# Patient Record
Sex: MALE | Race: WHITE
[De-identification: names, ages, dates, MRNs, and addresses within clinical notes are randomized per-mention and may not be internally consistent; named-entity substitution may affect disease eponyms.]

---

## 2017-03-31 ENCOUNTER — HOSPITAL ENCOUNTER (OUTPATIENT)
Dept: HOSPITAL 47 - RADCTMAIN | Age: 22
Discharge: HOME | End: 2017-03-31
Payer: COMMERCIAL

## 2017-03-31 DIAGNOSIS — R51: ICD-10-CM

## 2017-03-31 DIAGNOSIS — G51.9: ICD-10-CM

## 2017-03-31 DIAGNOSIS — R55: Primary | ICD-10-CM

## 2017-03-31 PROCEDURE — 70460 CT HEAD/BRAIN W/DYE: CPT

## 2017-04-01 NOTE — CT
EXAMINATION TYPE: CT brain w con

 

DATE OF EXAM: 3/31/2017 7:30 PM

 

COMPARISON: NONE

 

HISTORY: Pt states of HA and near syncope x1 week ago.

 

CT DLP: 945.9 mGycm

Automated exposure control for dose reduction was used.

 

CONTRAST: 

CT scan of the head is performed with IV Contrast, patient injected with 100 mL of Omnipaque 300.

 

FINDINGS: 

Central structures are midline. There is no evidence of hydrocephalus. No acute focal lesion, mass ef
fect or midline shift is seen. I do not see evidence of intracranial blood. There is no abnormal enha
ncement.

 

There is chronic mucoperiosteal disease involving the ethmoid and sphenoid sinuses bilaterally. There
 is a 2.9 cm retention cyst or polyp in the right sphenoid sinus and a 1.8 cm retention cyst or polyp
 in the left ethmoid sinus. Mastoid air cells are clear.

 

IMPRESSION:

1. NO ACUTE INTRACRANIAL ABNORMALITY.

2. CHRONIC SINUS MUCOSAL DISEASE.

## 2019-02-27 ENCOUNTER — HOSPITAL ENCOUNTER (OUTPATIENT)
Dept: HOSPITAL 47 - RADUSWWP | Age: 24
Discharge: HOME | End: 2019-02-27
Attending: INTERNAL MEDICINE
Payer: COMMERCIAL

## 2019-02-27 DIAGNOSIS — R31.9: Primary | ICD-10-CM

## 2019-02-27 PROCEDURE — 76770 US EXAM ABDO BACK WALL COMP: CPT

## 2019-02-27 NOTE — US
EXAMINATION TYPE: US kidneys/renal and bladder

 

DATE OF EXAM: 2/27/2019

 

COMPARISON: None

 

CLINICAL HISTORY: R31.9 Hematuria. Microscopic hematuria, patient states having back pain

 

EXAM MEASUREMENTS:

 

Right Kidney:  10.8 x 5.1 x 4.5 cm

Left Kidney: 10.4 x 5.0 x 5.3 cm

 

 

 

 

Right Kidney: No hydronephrosis or masses seen  

Left Kidney: No hydronephrosis or masses seen, prominent left renal cortex

Bladder: mildly distended, wnl as visualized

**Bilateral Jets seen

 

 

 

IMPRESSION:

Lobulated cortical margin of the left kidney recommend CT scan to exclude neoplasm.

## 2019-03-05 ENCOUNTER — HOSPITAL ENCOUNTER (OUTPATIENT)
Dept: HOSPITAL 47 - LABWHC1 | Age: 24
Discharge: HOME | End: 2019-03-05
Attending: INTERNAL MEDICINE
Payer: COMMERCIAL

## 2019-03-05 DIAGNOSIS — R94.4: Primary | ICD-10-CM

## 2019-03-05 LAB — BUN SERPL-SCNC: 16 MG/DL (ref 9–27)

## 2019-03-05 PROCEDURE — 84520 ASSAY OF UREA NITROGEN: CPT

## 2019-03-05 PROCEDURE — 82565 ASSAY OF CREATININE: CPT

## 2019-03-05 PROCEDURE — 36415 COLL VENOUS BLD VENIPUNCTURE: CPT

## 2019-03-12 ENCOUNTER — HOSPITAL ENCOUNTER (OUTPATIENT)
Dept: HOSPITAL 47 - RADCTMAIN | Age: 24
Discharge: HOME | End: 2019-03-12
Attending: INTERNAL MEDICINE
Payer: COMMERCIAL

## 2019-03-12 DIAGNOSIS — R31.9: Primary | ICD-10-CM

## 2019-03-12 DIAGNOSIS — N28.9: ICD-10-CM

## 2019-03-12 PROCEDURE — 74170 CT ABD WO CNTRST FLWD CNTRST: CPT

## 2019-03-12 NOTE — CT
EXAMINATION TYPE: CT abdomen wo/w con

 

DATE OF EXAM: 3/12/2019

 

COMPARISON: 7/9/2012

 

HISTORY: Abn US of Lt kidney, back pain

 

CT DLP: 1630 mGycm

Automated exposure control for dose reduction was used.

 

TECHNIQUE:  Helical acquisition of images was performed from the lung bases through the top of iliac 
crest to include entire abdomen.

 

CONTRAST: 

Performed with Oral Contrast and without and with IV Contrast, patient injected with 100 mL of Isovue
 300.

 

FINDINGS: 

 

LUNG BASES: No significant abnormality is appreciated.

 

LIVER/GB: No significant abnormality is appreciated.

 

PANCREAS: No significant abnormality is seen.

 

SPLEEN: No significant abnormality is seen.

 

ADRENALS: No significant abnormality is seen.

 

KIDNEYS: No evidence for renal mass. Hydronephrosis not present. No nephrolithiasis.

 

 

BOWEL:  No significant abnormality is seen.

 

LYMPH NODES:  No significant abnormality is seen.

 

OSSEOUS STRUCTURES:  No significant abnormality is seen.

 

FREE AIR:  No free air is visualized.

 

OTHER:

 

IMPRESSION: 

NO DISTINCT ABNORMALITY IDENTIFIED.

## 2019-11-29 ENCOUNTER — HOSPITAL ENCOUNTER (OUTPATIENT)
Dept: HOSPITAL 47 - RADUSWWP | Age: 24
Discharge: HOME | End: 2019-11-29
Attending: OTOLARYNGOLOGY
Payer: COMMERCIAL

## 2019-11-29 DIAGNOSIS — Z98.890: ICD-10-CM

## 2019-11-29 DIAGNOSIS — K21.9: Primary | ICD-10-CM

## 2019-11-29 PROCEDURE — 74220 X-RAY XM ESOPHAGUS 1CNTRST: CPT

## 2019-11-29 NOTE — FL
EXAMINATION TYPE: FL barium swallow

 

DATE OF EXAM: 11/29/2019

 

LIMITED UGI-ESOPHAGRAM:

 

CLINICAL HISTORY:  Sanchez fundoplication surgery in 2012 for hiatal hernia and ulcers with recurrent 
sore throat, voice hoarseness, and headache. No relief on restarting reflux medication.

 

TECHNIQUE:  Limited esophagram is performed utilizing 20 oz of thin barium. A total of 0.38 minutes o
f fluoroscopic time was utilized during procedure. 28 spot images are saved to PACS.

 

FINDINGS:  The patient swallowed contrast without difficulty or delay.  Esophageal peristalsis and mo
tility are within normal limits. There is good flow of contrast along the diaphragmatic hiatus into t
he stomach, there is no evidence of contrast extravasation to suggest leak. No recurrent hiatal herni
a is seen. Patient remains asymptomatic.

 

IMPRESSION: No evidence of recurrent hernia or significant obstruction status post Sanchez fundoplicat
ion surgery 2012. Unremarkable study.

## 2021-02-08 ENCOUNTER — HOSPITAL ENCOUNTER (EMERGENCY)
Dept: HOSPITAL 47 - EC | Age: 26
Discharge: HOME | End: 2021-02-08
Payer: COMMERCIAL

## 2021-02-08 VITALS
TEMPERATURE: 98.8 F | SYSTOLIC BLOOD PRESSURE: 129 MMHG | DIASTOLIC BLOOD PRESSURE: 85 MMHG | RESPIRATION RATE: 18 BRPM | HEART RATE: 85 BPM

## 2021-02-08 DIAGNOSIS — U07.1: Primary | ICD-10-CM

## 2021-02-08 DIAGNOSIS — Z79.899: ICD-10-CM

## 2021-02-08 DIAGNOSIS — Z90.49: ICD-10-CM

## 2021-02-08 DIAGNOSIS — Z88.0: ICD-10-CM

## 2021-02-08 DIAGNOSIS — F90.9: ICD-10-CM

## 2021-02-08 DIAGNOSIS — Z88.8: ICD-10-CM

## 2021-02-08 LAB
ALBUMIN SERPL-MCNC: 4.2 G/DL (ref 3.5–5)
ALP SERPL-CCNC: 53 U/L (ref 38–126)
ALT SERPL-CCNC: 33 U/L (ref 4–49)
ANION GAP SERPL CALC-SCNC: 8 MMOL/L
AST SERPL-CCNC: 25 U/L (ref 17–59)
BASOPHILS # BLD AUTO: 0.1 K/UL (ref 0–0.2)
BASOPHILS NFR BLD AUTO: 2 %
BUN SERPL-SCNC: 19 MG/DL (ref 9–20)
CALCIUM SPEC-MCNC: 9.1 MG/DL (ref 8.4–10.2)
CHLORIDE SERPL-SCNC: 106 MMOL/L (ref 98–107)
CO2 SERPL-SCNC: 25 MMOL/L (ref 22–30)
EOSINOPHIL # BLD AUTO: 0.1 K/UL (ref 0–0.7)
EOSINOPHIL NFR BLD AUTO: 2 %
ERYTHROCYTE [DISTWIDTH] IN BLOOD BY AUTOMATED COUNT: 5.47 M/UL (ref 4.3–5.9)
ERYTHROCYTE [DISTWIDTH] IN BLOOD: 12.5 % (ref 11.5–15.5)
GLUCOSE SERPL-MCNC: 112 MG/DL (ref 74–99)
HCT VFR BLD AUTO: 47.6 % (ref 39–53)
HGB BLD-MCNC: 16.4 GM/DL (ref 13–17.5)
LYMPHOCYTES # SPEC AUTO: 1.9 K/UL (ref 1–4.8)
LYMPHOCYTES NFR SPEC AUTO: 44 %
MCH RBC QN AUTO: 29.9 PG (ref 25–35)
MCHC RBC AUTO-ENTMCNC: 34.4 G/DL (ref 31–37)
MCV RBC AUTO: 87.1 FL (ref 80–100)
MONOCYTES # BLD AUTO: 0.3 K/UL (ref 0–1)
MONOCYTES NFR BLD AUTO: 7 %
NEUTROPHILS # BLD AUTO: 1.9 K/UL (ref 1.3–7.7)
NEUTROPHILS NFR BLD AUTO: 43 %
PLATELET # BLD AUTO: 274 K/UL (ref 150–450)
POTASSIUM SERPL-SCNC: 4 MMOL/L (ref 3.5–5.1)
PROT SERPL-MCNC: 6.9 G/DL (ref 6.3–8.2)
SODIUM SERPL-SCNC: 139 MMOL/L (ref 137–145)
WBC # BLD AUTO: 4.4 K/UL (ref 3.8–10.6)

## 2021-02-08 PROCEDURE — 96360 HYDRATION IV INFUSION INIT: CPT

## 2021-02-08 PROCEDURE — 83605 ASSAY OF LACTIC ACID: CPT

## 2021-02-08 PROCEDURE — 93005 ELECTROCARDIOGRAM TRACING: CPT

## 2021-02-08 PROCEDURE — 71046 X-RAY EXAM CHEST 2 VIEWS: CPT

## 2021-02-08 PROCEDURE — 85025 COMPLETE CBC W/AUTO DIFF WBC: CPT

## 2021-02-08 PROCEDURE — 80053 COMPREHEN METABOLIC PANEL: CPT

## 2021-02-08 PROCEDURE — 36415 COLL VENOUS BLD VENIPUNCTURE: CPT

## 2021-02-08 PROCEDURE — 99285 EMERGENCY DEPT VISIT HI MDM: CPT

## 2021-02-08 NOTE — ED
SOB HPI





- General


Chief Complaint: Shortness of Breath


Stated Complaint: +Covid, SOB


Time Seen by Provider: 02/08/21 18:57


Source: patient


Mode of arrival: ambulatory


Limitations: no limitations





- History of Present Illness


Initial Comments: 





Patient is 25-year-old male presents emergency department for any shortness of 

breath.  He did report that he tested positive for Covid on Friday.  She noted 

that today's about day 5 and noted that he had increased dyspnea on exertion 

while walking on the toilet today.  He stated that he works a few first few days

before he got tested.  He was in no apparent pain or distress while sitting up 

in bed.  He wanted to come in just to make sure that nothing was more serious.  

He denied any chest pain cough, productive cough, dizziness, lightheadedness, 

fever, fatigue, chills, nausea, vomiting, diarrhea, constipation





- Related Data


                                Home Medications











 Medication  Instructions  Recorded  Confirmed


 


Acetaminophen Tab [Tylenol Tab] 500 - 1,000 mg PO Q6H PRN 02/08/21 02/08/21


 


Dextroamphetamine/Amphetamine 20 mg PO TID 02/08/21 02/08/21





[Adderall]   


 


guaiFENesin [Mucinex] 1,200 mg PO BID PRN 02/08/21 02/08/21








                                  Previous Rx's











 Medication  Instructions  Recorded


 


predniSONE 50 mg PO DAILY #5 tab 02/08/21











                                    Allergies











Allergy/AdvReac Type Severity Reaction Status Date / Time


 


oxycodone Allergy  Unknown Verified 02/08/21 19:33


 


Penicillins Allergy  Abdominal Verified 02/08/21 19:33





   Pain  














Review of Systems


ROS Statement: 


Those systems with pertinent positive or pertinent negative responses have been 

documented in the HPI.





ROS Other: All systems not noted in ROS Statement are negative.





Past Medical History


Past Medical History: No Reported History


History of Any Multi-Drug Resistant Organisms: None Reported


Past Surgical History: Appendectomy, Hernia Repair, Orthopedic Surgery


Past Psychological History: ADD/ADHD


Smoking Status: Never smoker


Past Alcohol Use History: Occasional


Past Drug Use History: None Reported





General Exam


Limitations: no limitations


General appearance: alert, in no apparent distress


Head exam: Present: atraumatic, normocephalic, normal inspection


Eye exam: Present: normal appearance, PERRL, EOMI.  Absent: scleral icterus, 

conjunctival injection, periorbital swelling


ENT exam: Present: normal exam, mucous membranes moist


Neck exam: Present: normal inspection.  Absent: tenderness, meningismus, 

lymphadenopathy


Respiratory exam: Present: normal lung sounds bilaterally.  Absent: respiratory 

distress, wheezes, rales, rhonchi, stridor


Cardiovascular Exam: Present: regular rate, normal rhythm, normal heart sounds. 

 Absent: systolic murmur, diastolic murmur, rubs, gallop, clicks


GI/Abdominal exam: Present: soft, normal bowel sounds.  Absent: distended, 

tenderness, guarding, rebound, rigid


Extremities exam: Present: normal inspection, full ROM, normal capillary refill.

  Absent: tenderness, pedal edema, joint swelling, calf tenderness


Back exam: Present: normal inspection


Neurological exam: Present: alert, oriented X3, CN II-XII intact


Psychiatric exam: Present: normal affect, normal mood


Skin exam: Present: warm, dry, intact, normal color.  Absent: rash





Course


                                   Vital Signs











  02/08/21





  18:52


 


Temperature 98.8 F


 


Pulse Rate 85


 


Respiratory 18





Rate 


 


Blood Pressure 129/85


 


O2 Sat by Pulse 100





Oximetry 














Medical Decision Making





- Medical Decision Making





25-year-old male complaining of dyspnea on exertion status post testing positive

 for Covid on Friday


Chest x-ray, labs, EKG ordered.


Labs unremarkable


Case discussed with Dr. Beebe, was decided the patient discharged home with 

conservative minutes.





- Lab Data


Result diagrams: 


                                 02/08/21 19:18





                                 02/08/21 19:18


                                   Lab Results











  02/08/21 02/08/21 02/08/21 Range/Units





  19:18 19:18 19:18 


 


WBC  4.4    (3.8-10.6)  k/uL


 


RBC  5.47    (4.30-5.90)  m/uL


 


Hgb  16.4    (13.0-17.5)  gm/dL


 


Hct  47.6    (39.0-53.0)  %


 


MCV  87.1    (80.0-100.0)  fL


 


MCH  29.9    (25.0-35.0)  pg


 


MCHC  34.4    (31.0-37.0)  g/dL


 


RDW  12.5    (11.5-15.5)  %


 


Plt Count  274    (150-450)  k/uL


 


MPV  6.7    


 


Neutrophils %  43    %


 


Lymphocytes %  44    %


 


Monocytes %  7    %


 


Eosinophils %  2    %


 


Basophils %  2    %


 


Neutrophils #  1.9    (1.3-7.7)  k/uL


 


Lymphocytes #  1.9    (1.0-4.8)  k/uL


 


Monocytes #  0.3    (0-1.0)  k/uL


 


Eosinophils #  0.1    (0-0.7)  k/uL


 


Basophils #  0.1    (0-0.2)  k/uL


 


Sodium   139   (137-145)  mmol/L


 


Potassium   4.0   (3.5-5.1)  mmol/L


 


Chloride   106   ()  mmol/L


 


Carbon Dioxide   25   (22-30)  mmol/L


 


Anion Gap   8   mmol/L


 


BUN   19   (9-20)  mg/dL


 


Creatinine   0.84   (0.66-1.25)  mg/dL


 


Est GFR (CKD-EPI)AfAm   >90   (>60 ml/min/1.73 sqM)  


 


Est GFR (CKD-EPI)NonAf   >90   (>60 ml/min/1.73 sqM)  


 


Glucose   112 H   (74-99)  mg/dL


 


Plasma Lactic Acid Yasir    1.4  (0.7-2.0)  mmol/L


 


Calcium   9.1   (8.4-10.2)  mg/dL


 


Total Bilirubin   0.4   (0.2-1.3)  mg/dL


 


AST   25   (17-59)  U/L


 


ALT   33   (4-49)  U/L


 


Alkaline Phosphatase   53   ()  U/L


 


Total Protein   6.9   (6.3-8.2)  g/dL


 


Albumin   4.2   (3.5-5.0)  g/dL














- EKG Data


-: EKG Interpreted by Me


EKG shows normal: sinus rhythm


Rate: normal


EKG Comments: 





Ventricular rate 70 bpm, RI interval 160 ms, QRS duration 84 ms, QT//408 

ms, PareT axis 27/48/40.


Normal sinus rhythm, normal ECG.





- Radiology Data


Radiology results: report reviewed, image reviewed


No acute cardiopulmonary process





Disposition


Clinical Impression: 


 Shortness of breath





Disposition: HOME SELF-CARE


Condition: Stable


Instructions (If sedation given, give patient instructions):  Coronavirus 

Disease 2019 (COVID-19), Shortness of Breath (ED)


Additional Instructions: 


Please return to the Emergency Department if symptoms worsen or any other 

concerns.


Avoid strenuous physical activity for several days


Follow-up with primary care in 1-2 days


Take steroids as prescribed.


Is patient prescribed a controlled substance at d/c from ED?: No


Referrals: 


Damion Summers MD [Primary Care Provider] - 1-2 days


Time of Disposition: 20:16

## 2021-02-08 NOTE — XR
EXAMINATION TYPE: XR chest 2V

 

DATE OF EXAM: 2/8/2021

 

COMPARISON: NONE

 

HISTORY: Shortness of breath.

 

TECHNIQUE:  Frontal and lateral views of the chest are obtained.

 

FINDINGS:  There is no focal air space opacity, pleural effusion, or pneumothorax seen.  The cardiac 
silhouette size is within normal limits.   The osseous structures are intact.

 

IMPRESSION:  No acute cardiopulmonary process.

## 2021-03-24 ENCOUNTER — HOSPITAL ENCOUNTER (OUTPATIENT)
Dept: HOSPITAL 47 - RADUSWWP | Age: 26
Discharge: HOME | End: 2021-03-24
Attending: FAMILY MEDICINE
Payer: COMMERCIAL

## 2021-03-24 DIAGNOSIS — R14.3: Primary | ICD-10-CM

## 2021-03-24 PROCEDURE — 76700 US EXAM ABDOM COMPLETE: CPT

## 2021-03-24 NOTE — US
EXAMINATION TYPE: US abdomen complete

 

DATE OF EXAM: 3/24/2021

 

COMPARISON: NONE

 

CLINICAL HISTORY: R10.11 Right upper quadrant pain.

 

EXAM MEASUREMENTS:

 

Liver Length:  13.9 cm   

Gallbladder Wall:  0.1 cm   

CBD: 0.4 cm

Spleen:  10.4 cm   

Right Kidney:  12.3 x 4.7 x 5.1 cm 

Left Kidney:  11.1 x 5.3 x 5.4 cm   

 

Excessive overlying midline bowel gas.

 

Pancreas:  Obscured by bowel gas

Liver:  wnl  

Gallbladder:  wnl

**Evidence for sonographic Salmon's sign:

CBD:  wnl 

Spleen:  wnl   

Right Kidney:  No hydronephrosis or masses seen. inferior pole obscured by bowel gas   

Left Kidney:  No hydronephrosis or masses seen   

Upper IVC:  wnl  

Abd Aorta: Proximal portion obscured by overlying bowel gas

 

 

IMPRESSION: 

1. Visualized abdomen ultrasound is unremarkable. Exam is limited by excessive bowel gas present at t
he time of this exam

## 2021-09-23 ENCOUNTER — HOSPITAL ENCOUNTER (EMERGENCY)
Dept: HOSPITAL 47 - EC | Age: 26
Discharge: HOME | End: 2021-09-23
Payer: COMMERCIAL

## 2021-09-23 VITALS
HEART RATE: 82 BPM | TEMPERATURE: 98.2 F | DIASTOLIC BLOOD PRESSURE: 81 MMHG | SYSTOLIC BLOOD PRESSURE: 148 MMHG | RESPIRATION RATE: 20 BRPM

## 2021-09-23 DIAGNOSIS — Z90.49: ICD-10-CM

## 2021-09-23 DIAGNOSIS — R59.0: Primary | ICD-10-CM

## 2021-09-23 DIAGNOSIS — Z88.5: ICD-10-CM

## 2021-09-23 DIAGNOSIS — Z88.0: ICD-10-CM

## 2021-09-23 DIAGNOSIS — F90.9: ICD-10-CM

## 2021-09-23 DIAGNOSIS — Z87.891: ICD-10-CM

## 2021-09-23 PROCEDURE — 99283 EMERGENCY DEPT VISIT LOW MDM: CPT

## 2021-09-23 NOTE — ED
General Adult HPI





- General


Chief complaint: Dizziness


Stated complaint: Dizziness,Bump behind ear


Time Seen by Provider: 09/23/21 21:22


Source: patient


Mode of arrival: ambulatory


Limitations: no limitations





- History of Present Illness


Initial comments: 





25-year-old male presents to emergency department with a chief complaint of a 

mass behind the ear and some dizziness.  Patient reports he noticed the "bump" 

about 1 hour ago prior to arrival.  Patient reports it is tender to touch but 

denies any significant erythema in the region.  Denies any changes in his 

hearing or pain with pulling on the auricle.  He denies any discharge from the 

ear.  Denies any fevers or chills.  He also reports feeling slightly dizzy where

the room is spinning around him but denies any light headedness.  States this is

intermittent but it has resolved at this time.  Denies any headaches visual 

changes one-sided weakness or paresthesias at this time.





- Related Data


                                Home Medications











 Medication  Instructions  Recorded  Confirmed


 


Dextroamphetamine/Amphetamine 10 - 20 mg PO TID PRN 02/08/21 09/23/21





[Adderall]   








                                  Previous Rx's











 Medication  Instructions  Recorded


 


hydrOXYzine HCL [Atarax] 25 mg PO TID PRN #15 tab 09/23/21











                                    Allergies











Allergy/AdvReac Type Severity Reaction Status Date / Time


 


oxycodone Allergy  Unknown Verified 09/23/21 22:02


 


Penicillins Allergy  Abdominal Verified 09/23/21 22:02





   Pain  














Review of Systems


ROS Statement: 


Those systems with pertinent positive or pertinent negative responses have been 

documented in the HPI.





ROS Other: All systems not noted in ROS Statement are negative.





Past Medical History


Past Medical History: No Reported History


History of Any Multi-Drug Resistant Organisms: None Reported


Past Surgical History: Appendectomy, Hernia Repair, Orthopedic Surgery


Past Psychological History: ADD/ADHD


Smoking Status: Former smoker


Past Alcohol Use History: Occasional


Past Drug Use History: None Reported





General Exam


Limitations: no limitations


General appearance: alert, in no apparent distress


Head exam: Present: atraumatic, normocephalic, normal inspection


Eye exam: Present: normal appearance, PERRL, EOMI


Pupils: Present: normal accommodation


ENT exam: Present: normal exam, normal oropharynx, mucous membranes moist, TM's 

normal bilaterally, normal external ear exam (Posterior auricular lymph node, 

right side.)


Neck exam: Present: normal inspection, full ROM.  Absent: tenderness, 

lymphadenopathy


Respiratory exam: Present: normal lung sounds bilaterally.  Absent: respiratory 

distress, rales


Cardiovascular Exam: Present: regular rate, normal rhythm, normal heart sounds. 

 Absent: systolic murmur


Extremities exam: Present: normal inspection, full ROM.  Absent: tenderness


Back exam: Present: normal inspection, full ROM.  Absent: tenderness, CVA 

tenderness (R)


Neurological exam: Present: alert, oriented X3


Psychiatric exam: Present: normal affect, normal mood


Skin exam: Present: warm, dry, intact, normal color





Course


                                   Vital Signs











  09/23/21





  21:11


 


Temperature 98.2 F


 


Pulse Rate 82


 


Respiratory 20





Rate 


 


Blood Pressure 148/81


 


O2 Sat by Pulse 100





Oximetry 














Medical Decision Making





- Medical Decision Making





25-year-old male presents to emergency Department with a chief complaint of a 

mass behind the ear.  Physical examination I do suspect this to be a lymph node.

  Rest of ENT examination is unremarkable.  I have no concern for otitis media 

or externa.  Have low concern for mastoiditis at this time.  However, did offer 

the patient CT imaging, he declined.  I gave him contact information to follow 

up with ENT specialist.  Return parameters were thoroughly discussed the patient

 is standing and agreeable.





Disposition


Clinical Impression: 


 Lymph nodes enlarged





Disposition: HOME SELF-CARE


Condition: Stable


Instructions (If sedation given, give patient instructions):  Lymphadenopathy 

(ED)


Additional Instructions: 


Follow-up with an ENT specialist.  Return to emergency department if symptoms 

worsen.


Prescriptions: 


hydrOXYzine HCL [Atarax] 25 mg PO TID PRN #15 tab


 PRN Reason: Vertigo


Is patient prescribed a controlled substance at d/c from ED?: No


Referrals: 


Damion Summers MD [Primary Care Provider] - 1-2 days


Raul Rivas MD [STAFF PHYSICIAN] - 1-2 days


Time of Disposition: 22:03

## 2021-11-20 ENCOUNTER — HOSPITAL ENCOUNTER (OUTPATIENT)
Dept: HOSPITAL 47 - LABWHC1 | Age: 26
Discharge: HOME | End: 2021-11-20
Attending: INTERNAL MEDICINE
Payer: COMMERCIAL

## 2021-11-20 DIAGNOSIS — E66.9: Primary | ICD-10-CM

## 2021-11-20 LAB — T4 FREE SERPL-MCNC: 1.24 NG/DL (ref 0.8–1.8)

## 2021-11-20 PROCEDURE — 84443 ASSAY THYROID STIM HORMONE: CPT

## 2021-11-20 PROCEDURE — 36415 COLL VENOUS BLD VENIPUNCTURE: CPT

## 2021-11-20 PROCEDURE — 84439 ASSAY OF FREE THYROXINE: CPT

## 2021-11-20 PROCEDURE — 84481 FREE ASSAY (FT-3): CPT

## 2025-01-24 ENCOUNTER — HOSPITAL ENCOUNTER (EMERGENCY)
Dept: HOSPITAL 47 - EC | Age: 30
Discharge: HOME | End: 2025-01-24
Payer: COMMERCIAL

## 2025-01-24 VITALS — HEART RATE: 63 BPM | SYSTOLIC BLOOD PRESSURE: 110 MMHG | DIASTOLIC BLOOD PRESSURE: 69 MMHG | TEMPERATURE: 99.5 F

## 2025-01-24 VITALS — RESPIRATION RATE: 18 BRPM
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
## 2025-01-24 DIAGNOSIS — S16.1XXA: Primary | ICD-10-CM

## 2025-01-24 DIAGNOSIS — Z88.5: ICD-10-CM

## 2025-01-24 DIAGNOSIS — R42: ICD-10-CM

## 2025-01-24 DIAGNOSIS — Z88.0: ICD-10-CM

## 2025-01-24 DIAGNOSIS — R51.9: ICD-10-CM

## 2025-01-24 DIAGNOSIS — Z87.891: ICD-10-CM

## 2025-01-24 LAB
ALBUMIN SERPL-MCNC: 4.8 G/DL (ref 3.5–5)
ALP SERPL-CCNC: 59 U/L (ref 38–126)
ALT SERPL-CCNC: 70 U/L (ref 4–49)
ANION GAP SERPL CALC-SCNC: 12 MMOL/L
APTT BLD: 23.4 SEC (ref 22–30)
AST SERPL-CCNC: 32 U/L (ref 17–59)
BASOPHILS # BLD AUTO: 0.1 K/UL (ref 0–0.2)
BASOPHILS NFR BLD AUTO: 1 %
BUN SERPL-SCNC: 19 MG/DL (ref 9–20)
CALCIUM SPEC-MCNC: 9.8 MG/DL (ref 8.4–10.2)
CHLORIDE SERPL-SCNC: 102 MMOL/L (ref 98–107)
CO2 SERPL-SCNC: 26 MMOL/L (ref 22–30)
EOSINOPHIL # BLD AUTO: 0.2 K/UL (ref 0–0.7)
EOSINOPHIL NFR BLD AUTO: 2 %
ERYTHROCYTE [DISTWIDTH] IN BLOOD BY AUTOMATED COUNT: 5.4 M/UL (ref 4.3–5.9)
ERYTHROCYTE [DISTWIDTH] IN BLOOD: 12.8 % (ref 11.5–15.5)
GLUCOSE SERPL-MCNC: 90 MG/DL (ref 74–99)
HCT VFR BLD AUTO: 47.5 % (ref 39–53)
HGB BLD-MCNC: 15.7 GM/DL (ref 13–17.5)
INR PPP: 1 (ref ?–1.2)
LYMPHOCYTES # SPEC AUTO: 2.8 K/UL (ref 1–4.8)
LYMPHOCYTES NFR SPEC AUTO: 25 %
MAGNESIUM SPEC-SCNC: 2 MG/DL (ref 1.6–2.3)
MCH RBC QN AUTO: 29.1 PG (ref 25–35)
MCHC RBC AUTO-ENTMCNC: 33.1 G/DL (ref 31–37)
MCV RBC AUTO: 87.9 FL (ref 80–100)
MONOCYTES # BLD AUTO: 0.6 K/UL (ref 0–1)
MONOCYTES NFR BLD AUTO: 5 %
NEUTROPHILS # BLD AUTO: 7.3 K/UL (ref 1.3–7.7)
NEUTROPHILS NFR BLD AUTO: 66 %
PLATELET # BLD AUTO: 340 K/UL (ref 150–450)
POTASSIUM SERPL-SCNC: 4 MMOL/L (ref 3.5–5.1)
PROT SERPL-MCNC: 7.4 G/DL (ref 6.3–8.2)
PT BLD: 10.6 SEC (ref 10–12.5)
SODIUM SERPL-SCNC: 140 MMOL/L (ref 137–145)
WBC # BLD AUTO: 11.1 K/UL (ref 3.8–10.6)

## 2025-01-24 PROCEDURE — 80053 COMPREHEN METABOLIC PANEL: CPT

## 2025-01-24 PROCEDURE — 85025 COMPLETE CBC W/AUTO DIFF WBC: CPT

## 2025-01-24 PROCEDURE — 96375 TX/PRO/DX INJ NEW DRUG ADDON: CPT

## 2025-01-24 PROCEDURE — 85730 THROMBOPLASTIN TIME PARTIAL: CPT

## 2025-01-24 PROCEDURE — 83735 ASSAY OF MAGNESIUM: CPT

## 2025-01-24 PROCEDURE — 85610 PROTHROMBIN TIME: CPT

## 2025-01-24 PROCEDURE — 96361 HYDRATE IV INFUSION ADD-ON: CPT

## 2025-01-24 PROCEDURE — 99284 EMERGENCY DEPT VISIT MOD MDM: CPT

## 2025-01-24 PROCEDURE — 70498 CT ANGIOGRAPHY NECK: CPT

## 2025-01-24 PROCEDURE — 36415 COLL VENOUS BLD VENIPUNCTURE: CPT

## 2025-01-24 PROCEDURE — 96374 THER/PROPH/DIAG INJ IV PUSH: CPT

## 2025-01-24 PROCEDURE — 70450 CT HEAD/BRAIN W/O DYE: CPT

## 2025-01-24 PROCEDURE — 70496 CT ANGIOGRAPHY HEAD: CPT

## 2025-01-24 RX ADMIN — DEXTROSE STA MG: 50 INJECTION, SOLUTION INTRAVENOUS at 19:54

## 2025-01-24 RX ADMIN — PROCHLORPERAZINE EDISYLATE STA MG: 5 INJECTION INTRAMUSCULAR; INTRAVENOUS at 19:52

## 2025-01-24 RX ADMIN — ACETAMINOPHEN STA MG: 500 TABLET ORAL at 16:54

## 2025-01-24 RX ADMIN — KETOROLAC TROMETHAMINE STA MG: 15 INJECTION, SOLUTION INTRAMUSCULAR; INTRAVENOUS at 19:55

## 2025-01-24 RX ADMIN — MECLIZINE STA MG: 12.5 TABLET ORAL at 16:54

## 2025-01-24 RX ADMIN — CEFAZOLIN STA MLS/HR: 330 INJECTION, POWDER, FOR SOLUTION INTRAMUSCULAR; INTRAVENOUS at 17:00

## 2025-01-24 NOTE — CT
EXAMINATION TYPE: CT brain wo con

 

DATE OF EXAM: 1/24/2025 6:19 PM

 

COMPARISON: 3/31/2017 

 

CLINICAL INDICATION: Male, 29 years old with history of Thunderclap headache, Thunderclap headache

 

TECHNIQUE: CT of the brain is performed utilizing 3 mm thick sections through the posterior fossa and
 3 mm thick sections through the remaining calvarium.  Study is performed within 24 hours of arrival 
to the hospital. 

Contrast used: mL of , (none if empty)

CT DLP: 2008.4 mGycm, Automated exposure control for dose reduction was used.

 

FINDINGS:

 

No abnormal hyperdensity is present to suggest an acute intracranial hemorrhage. No acute subarachnoi
d hemorrhage identified.

No mass lesion is evident.

No acute infarcts are evident.

Ventricles and sulci are appropriate for the patient age.  

There is a retention cyst within the inferior left maxillary sinus. Some opacification of anterior et
hmoid air cells is present. Remaining paranasal sinuses and mastoid air cells are clear.

 

IMPRESSION:

1.   No acute intracranial process. Follow up MRI can be performed as clinically indicated.

 

 

[facility], Workstation: [email or web address], 1/24/2025 6:33 PM

## 2025-01-24 NOTE — CT
EXAMINATION TYPE: CT angio head neck

 

DATE OF EXAM: 1/24/2025 6:24 PM

 

COMPARISON: None. 

 

CLINICAL INDICATION: Male, 29 years old with history of Thunderclap headache, Thunderclap headache

 

TECHNIQUE: CTA scan is performed with axial images are obtained, coronal and sagittal reformatted juan
ges are reviewed. 3-D reconstructed images are created on an independent workstation and reviewed.  S
[facility] images are reviewed. NASCET criteria was used in interpretation of this exam?

 

Contrast used:65 mL of Isovue 370 without and with IV Contrast, (none if empty)

Oral contrast used: (none if empty)

CT DLP: 2008.4 mGycm, Automated exposure control for dose reduction was used.

 

FINDINGS:

 

Carotid/Vascular Structures: Common origin of the left common carotid artery and right subclavian art
cosmo from the innominate.

Common carotid arteries bifurcate into internal and external carotid arteries without significant bruce
w limiting stenosis.

Left vertebral artery is slightly dominant..

Internal carotid arteries and vertebral arteries are patent to the skull base.

 

Cervical of [place]: Vertebral basilar system appears normal. Posterior cerebral vasculature is unrema
rkable. Internal carotid arteries bifurcate normally into A1 and M1 segments. A2 segments are normal.


The anterior communicating artery is patent.

The right posterior communicating artery is patent.

The left posterior communicating artery is patent.

 

Other: Lung apices within the field of view are clear.

 

IMPRESSION:

1. No flow-limiting stenosis bilateral carotid bifurcations.

2. Normal [place] of [place].

3. No suspicious areas for aneurysm or aneurysm rupture.

 

[facility], Workstation: [id number], 1/24/2025 7:20 PM

## 2025-01-24 NOTE — ED
Dizziness HPI





- General


Chief Complaint: Neuro Symptoms/Deficit


Stated Complaint: dizziness,neck pain


Time Seen by Provider: 01/24/25 16:30


Source: patient, RN notes reviewed


Mode of arrival: ambulatory


Limitations: no limitations





- History of Present Illness


Initial Comments: 


This is a 29-year-old male who presents to the emergency department for dizzin

ess and neck pain.  2 days ago the patient was playing around with his son and 

he felt a pop in the back of his neck.  He developed an immediate headache 

afterwards along with dizziness and nausea.  He states that the headache has 

since started to subside to some extent, but is still present and bothersome.  

His concern is now that he feels very dizzy, especially when standing up.  He 

also still feels a pain in the back of his neck, but is still able to fully turn

his head.  Denies any residual nausea with this.  Denies any history of similar 

pain in the past.





MD Complaint: dizziness





- Related Data


                                Home Medications











 Medication  Instructions  Recorded  Confirmed


 


Dextroamphetamine/Amphetamine 10 - 20 mg PO TID PRN 02/08/21 09/23/21





[Adderall]   








                                  Previous Rx's











 Medication  Instructions  Recorded


 


hydrOXYzine HCL [Atarax] 25 mg PO TID PRN #15 tab 09/23/21


 


Ketorolac [Toradol] 10 mg PO Q6HR PRN #15 tab 01/24/25


 


Meclizine HCl [Dramamine] 25 mg PO QID PRN #20 tab 01/24/25


 


methocarbamoL [Robaxin-750] 1,500 mg PO TID PRN #30 tab 01/24/25











                                    Allergies











Allergy/AdvReac Type Severity Reaction Status Date / Time


 


oxycodone Allergy  Unknown Verified 01/24/25 16:09


 


Penicillins Allergy  Abdominal Verified 01/24/25 16:09





   Pain  














Review of Systems


ROS Statement: 


Those systems with pertinent positive or pertinent negative responses have been 

documented in the HPI.





ROS Other: All systems not noted in ROS Statement are negative.





Past Medical History


Past Medical History: No Reported History


History of Any Multi-Drug Resistant Organisms: None Reported


Past Surgical History: Appendectomy, Hernia Repair, Orthopedic Surgery


Additional Past Surgical History / Comment(s): miniscus surgery bilateral knees


Past Psychological History: ADD/ADHD


Smoking Status: Former smoker


Past Alcohol Use History: Occasional


Past Drug Use History: None Reported





General Exam


Limitations: no limitations


General appearance: alert, in no apparent distress


Head exam: Present: atraumatic, normocephalic, normal inspection


Eye exam: Present: normal appearance, PERRL, EOMI.  Absent: scleral icterus, 

conjunctival injection, periorbital swelling


Respiratory exam: Present: normal lung sounds bilaterally.  Absent: respiratory 

distress, wheezes, rales, rhonchi, stridor


Cardiovascular Exam: Present: regular rate, normal rhythm, normal heart sounds. 

Absent: systolic murmur, diastolic murmur, rubs, gallop, clicks


Neurological exam: Present: alert, oriented X3, CN II-XII intact


  ** Expanded


Cerebellar function: Finger to Nose: Normal, Heel to Shin: Normal, Romberg: 

Normal


Upper motor neuron: Pronator Drift: Normal


Motor strength exam: RUE: 5, LUE: 5, RLE: 5, LLE: 5


Psychiatric exam: Present: normal affect, normal mood


Skin exam: Present: warm, dry, intact, normal color.  Absent: rash





Course


                                   Vital Signs











  01/24/25 01/24/25





  16:09 19:48


 


Temperature 98 F 99.5 F


 


Pulse Rate 72 63


 


Respiratory 18 18





Rate  


 


Blood Pressure 133/80 110/69


 


O2 Sat by Pulse 100 99





Oximetry  














Medical Decision Making





- Medical Decision Making


This is a 29 year old male who presents to the emergency department for a 

headache and dizziness. 





Was pt. sent in by a medical professional or institution?


@  -No   


Did you speak to anyone other than the patient for history?  


@  -No


Did you review nursing and triage notes? 


@  -Yes, and I agree, it is accurate with regards to the patient's symptoms.


Were old charts reviewed? 


@  -No


Differential Diagnosis? 


@  -Differential Headache:


Migraine, tension, cluster, carbon monoxide, central venous thrombosis, pension 

karma temporal arteritis, acute closure glaucoma, intercranial hemorrhage, 

mastoiditis, sinusitis, head injury, this is not meant to be an all-inclusive 

list.


EKG interpreted by me (3pts min.)?


@  -Not obtained


X-rays interpreted by me (1pt min.)?


@  -Not obtained


CT interpreted by me (1pt min.)?


@  -CT scan of the brain obtained.  My interpretation identifies no evidence of 

an acute intracranial hemorrhage.  CTA of the head and neck obtained.  My 

interpretation identifies no evidence of aneurysm.


U/S interpreted by me (1pt. min.)?


@  -Not obtained


What testing was considered but not performed? (CT, X-rays, U/S, labs)? Why?


@  -None


What meds were considered but not given? Why?


@  -None


Did you discuss the management of the patient with other professionals?


@  -No


Did you reconcile home meds?


@  -No


Was smoking cessation discussed for >3mins.?


@  -No


Was critical care preformed (if so, how long)?


@  -No


Were there social determinants of health that impacted care today? How? 

(Homelessness, low income, unemployed, alcoholism, drug addiction, 

transportation, low edu. Level, literacy, decrease access to med. care, senior care, 

rehab)?


@  -No


Was there de-escalation of care discussed even if they declined? (Discuss DNR or

withdrawal of care, Hospice)?


@  -No


What co-morbidities impacted this encounter? (DM, HTN, Smoking, COPD, CAD, 

Cancer, CVA, Hep., AIDS, mental health diagnosis, sleep apnea, morbid obesity)?


@  -None


Was patient admitted / discharged?


@  -Discharged.  Patient's NIH was 0 and he had no neurological deficits.  Lab 

work demonstrates mild leukocytosis and was otherwise unremarkable.  The 

description of his symptoms was similar to a thunderclap headache and we 

proceeded with imaging including a CT scan of the brain and CTA of the head and 

neck to evaluate for any signs of a subarachnoid hemorrhage.  Both images 

revealed no acute process.  His symptoms were controlled in the emergency 

department.  He describes the dizziness as a room spinning sensation and the 

meclizine he was given did seem to improve this.  He was given a prescription 

for meclizine as well as Toradol and Robaxin for any additional headache and 

neck pain.  Otherwise advised follow-up with his PCP for reevaluation.  Patient 

discharged home in stable condition.  Case discussed with ED attending Dr. Gonzales.  





Return precautions reviewed in depth, the patient is instructed to return to the

emergency department with any new, worsening, or concerning symptoms. Patient 

verbalized understanding.


Undiagnosed new problem with uncertain prognosis?


@  -None


Drug Therapy requiring intensive monitoring for toxicity (Heparin, Nitro, 

Insulin, Cardizem)?


@  -None


Were any procedures done?


@  -None


Diagnosis/symptom?


@  -Cervical strain, dizziness, headache


Acute, or Chronic, or Acute on Chronic?


@  -Acute


Uncomplicated (without systemic symptoms) or Complicated (systemic symptoms)?


@  -Uncomplicated


Side effects of treatment?


@  -None


Exacerbation, Progression, or Severe Exacerbation]


@  -Not applicable


Poses a threat to life or bodily function?


@  -No








- Lab Data


Result diagrams: 


                                 01/24/25 16:48





                                 01/24/25 16:48


                                   Lab Results











  01/24/25 01/24/25 01/24/25 Range/Units





  16:48 16:48 16:48 


 


WBC  11.1 H    (3.8-10.6)  k/uL


 


RBC  5.40    (4.30-5.90)  m/uL


 


Hgb  15.7    (13.0-17.5)  gm/dL


 


Hct  47.5    (39.0-53.0)  %


 


MCV  87.9    (80.0-100.0)  fL


 


MCH  29.1    (25.0-35.0)  pg


 


MCHC  33.1    (31.0-37.0)  g/dL


 


RDW  12.8    (11.5-15.5)  %


 


Plt Count  340    (150-450)  k/uL


 


MPV  6.7    


 


Neutrophils %  66    %


 


Lymphocytes %  25    %


 


Monocytes %  5    %


 


Eosinophils %  2    %


 


Basophils %  1    %


 


Neutrophils #  7.3    (1.3-7.7)  k/uL


 


Lymphocytes #  2.8    (1.0-4.8)  k/uL


 


Monocytes #  0.6    (0-1.0)  k/uL


 


Eosinophils #  0.2    (0-0.7)  k/uL


 


Basophils #  0.1    (0-0.2)  k/uL


 


PT   10.6   (10.0-12.5)  sec


 


INR   1.0   (<1.2)  


 


APTT   23.4   (22.0-30.0)  sec


 


Sodium    140  (137-145)  mmol/L


 


Potassium    4.0  (3.5-5.1)  mmol/L


 


Chloride    102  ()  mmol/L


 


Carbon Dioxide    26  (22-30)  mmol/L


 


Anion Gap    12  mmol/L


 


BUN    19  (9-20)  mg/dL


 


Creatinine    0.97  (0.66-1.25)  mg/dL


 


Est GFR (CKD-EPI)AfAm    >90  (>60 ml/min/1.73 sqM)  


 


Est GFR (CKD-EPI)NonAf    >90  (>60 ml/min/1.73 sqM)  


 


Glucose    90  (74-99)  mg/dL


 


Calcium    9.8  (8.4-10.2)  mg/dL


 


Magnesium    2.0  (1.6-2.3)  mg/dL


 


Total Bilirubin    0.4  (0.2-1.3)  mg/dL


 


AST    32  (17-59)  U/L


 


ALT    70 H  (4-49)  U/L


 


Alkaline Phosphatase    59  ()  U/L


 


Total Protein    7.4  (6.3-8.2)  g/dL


 


Albumin    4.8  (3.5-5.0)  g/dL














- Radiology Data


Radiology results: report reviewed, image reviewed





Disposition


Clinical Impression: 


 Headache, Neck pain, Dizziness





Disposition: HOME SELF-CARE


Instructions (If sedation given, give patient instructions):  Cervical Strain 

(ED), Vertigo (ED), Benign Paroxysmal Positional Vertigo (ED), Dizziness (ED)


Additional Instructions: 


Return to the emergency department with any new, worsening, or concerning 

symptoms.  Take the Toradol with Tylenol as needed for pain relief.  If you 

choose to take the Toradol, do not take any other anti-inflammatories such as 

ibuprofen, take one or the other.  You can take the Robaxin as 1 to 2 tablets up

to 3-4 times daily.  You can take the meclizine up to 4 times daily for feelings

of dizziness/vertigo.  Follow up with your primary care provider in 1-2 days.


Prescriptions: 


Meclizine HCl [Dramamine] 25 mg PO QID PRN #20 tab


 PRN Reason: Vertigo


methocarbamoL [Robaxin-750] 1,500 mg PO TID PRN #30 tab


 PRN Reason: Pain


Ketorolac [Toradol] 10 mg PO Q6HR PRN #15 tab


 PRN Reason: Pain


Is patient prescribed a controlled substance at d/c from ED?: No


Referrals: 


Domi Romero MD [Primary Care Provider] - 1-2 days


Time of Disposition: 19:39